# Patient Record
Sex: MALE | URBAN - METROPOLITAN AREA
[De-identification: names, ages, dates, MRNs, and addresses within clinical notes are randomized per-mention and may not be internally consistent; named-entity substitution may affect disease eponyms.]

---

## 2019-12-20 ENCOUNTER — OP HISTORICAL/CONVERTED ENCOUNTER (OUTPATIENT)
Dept: OTHER | Age: 34
End: 2019-12-20

## 2022-08-16 ENCOUNTER — OFFICE VISIT (OUTPATIENT)
Dept: ORTHOPEDIC SURGERY | Age: 37
End: 2022-08-16
Payer: OTHER GOVERNMENT

## 2022-08-16 VITALS — HEIGHT: 73 IN | WEIGHT: 225 LBS | BODY MASS INDEX: 29.82 KG/M2

## 2022-08-16 DIAGNOSIS — M22.8X9 PATELLAR MALTRACKING, UNSPECIFIED LATERALITY: Primary | ICD-10-CM

## 2022-08-16 PROCEDURE — 99203 OFFICE O/P NEW LOW 30 MIN: CPT | Performed by: ORTHOPAEDIC SURGERY

## 2022-08-16 NOTE — PROGRESS NOTES
Christopher Pardo (: 1985) is a 39 y.o. male, patient, here for evaluation of the following chief complaint(s):  Knee Pain (Bilateral knee pain - left worse than right )       HPI:    Second opinion regarding bilateral anterior knee pain. Patient for the last 5 or 6 years has had problems in the anterior aspects of both of his knees. Worse with bending and squatting. Treatments thus far have included a full course of physical therapy for vastus medialis and quadriceps strengthening. He is also had a hyaluronic acid injection that did absolutely nothing. Takes NSAIDs but tries to avoid them. Patient is retiring from [employer] in a year. He is trying to come up with long and short term treatment plan. He has had a recent MRI. No Known Allergies    Current Outpatient Medications   Medication Sig    OMEPRAZOLE PO Take  by mouth. cpap machine kit by Does Not Apply route. No current facility-administered medications for this visit.        Past Medical History:   Diagnosis Date    Hernia, hiatal     Sleep apnea         Past Surgical History:   Procedure Laterality Date    HX HERNIA REPAIR      HX WISDOM TEETH EXTRACTION         Family History   Problem Relation Age of Onset    Bipolar Disorder Mother     Cancer Maternal Grandmother     Lung Disease Maternal Grandmother         Social History     Socioeconomic History    Marital status:      Spouse name: Not on file    Number of children: Not on file    Years of education: Not on file    Highest education level: Not on file   Occupational History    Not on file   Tobacco Use    Smoking status: Never     Passive exposure: Past    Smokeless tobacco: Former     Types: Chew   Vaping Use    Vaping Use: Never used   Substance and Sexual Activity    Alcohol use: Yes    Drug use: Not on file    Sexual activity: Not on file   Other Topics Concern    Not on file   Social History Narrative    Not on file     Social Determinants of Health     Financial Resource Strain: Not on file   Food Insecurity: Not on file   Transportation Needs: Not on file   Physical Activity: Not on file   Stress: Not on file   Social Connections: Not on file   Intimate Partner Violence: Not on file   Housing Stability: Not on file       ROS    Positive for: Musculoskeletal  Last edited by Fabrice Lagos on 8/16/2022  3:10 PM.            Vitals:  Ht 6' 1\" (1.854 m)   Wt 225 lb (102.1 kg)   BMI 29.69 kg/m²    Body mass index is 29.69 kg/m². PHYSICAL EXAM:  On exam today bilateral knees were examined. Both hips have painless range of motion. He has slight valgus malalignment of both knees. Both knees range 0 to 120 degrees. No instability is noted. Luis Manuel's testing is negative. Both patellas track laterally with positive J sign. His TT distance clinically is increased with abnormal Q angle. IMAGING:  Viewed MRI. TT distance about 2 cm both knees. Remainder of his joint looks fairly good with no meniscal tearing. Does have some early arthritic changes of his lateral patella facet. ASSESSMENT/PLAN:  1. Patellar maltracking, unspecified laterality  Options for him include activity modification and acceptance of his anterior knee pain. Surgical options include tibial tubercle osteotomy or later down the road a patellofemoral joint replacement. He has had appropriate conservative treatment including injections and physical therapy. Patient is going to think about his treatment options. If he decides to proceed with a TT O then he should follow-up with one of our sports medicine docs. An electronic signature was used to authenticate this note.   --Ruth Hernandez MD